# Patient Record
Sex: FEMALE | Race: WHITE | Employment: FULL TIME | URBAN - METROPOLITAN AREA
[De-identification: names, ages, dates, MRNs, and addresses within clinical notes are randomized per-mention and may not be internally consistent; named-entity substitution may affect disease eponyms.]

---

## 2024-11-13 ENCOUNTER — HOSPITAL ENCOUNTER (EMERGENCY)
Age: 29
Discharge: HOME | End: 2024-11-13
Payer: COMMERCIAL

## 2024-11-13 VITALS
OXYGEN SATURATION: 100 % | BODY MASS INDEX: 24.96 KG/M2 | TEMPERATURE: 98.4 F | RESPIRATION RATE: 16 BRPM | HEART RATE: 77 BPM | WEIGHT: 150 LBS | DIASTOLIC BLOOD PRESSURE: 82 MMHG | SYSTOLIC BLOOD PRESSURE: 121 MMHG

## 2024-11-13 DIAGNOSIS — J02.0 STREP PHARYNGITIS: Primary | ICD-10-CM

## 2024-11-13 LAB
SPECIMEN SOURCE: ABNORMAL
STREP A, MOLECULAR: POSITIVE

## 2024-11-13 PROCEDURE — 99211 OFF/OP EST MAY X REQ PHY/QHP: CPT

## 2024-11-13 PROCEDURE — 87651 STREP A DNA AMP PROBE: CPT

## 2024-11-13 RX ORDER — PREDNISONE 20 MG/1
20 TABLET ORAL 2 TIMES DAILY
Qty: 10 TABLET | Refills: 0 | Status: SHIPPED | OUTPATIENT
Start: 2024-11-13 | End: 2024-11-18

## 2024-11-13 RX ORDER — CEFDINIR 300 MG/1
300 CAPSULE ORAL 2 TIMES DAILY
Qty: 20 CAPSULE | Refills: 0 | Status: SHIPPED | OUTPATIENT
Start: 2024-11-13 | End: 2024-11-23

## 2024-11-13 ASSESSMENT — PAIN - FUNCTIONAL ASSESSMENT: PAIN_FUNCTIONAL_ASSESSMENT: NONE - DENIES PAIN

## 2024-11-13 NOTE — ED PROVIDER NOTES
Independent [facility] Visit.    HPI:  11/13/24,   Time: 5:50 PM ARACELI Brown is a 29 y.o. female presenting to the ED for pharyngitis.  Started suddenly this morning.  Her fiancé currently has strep and she now she thinks she has it.  No fevers.  Hurts to swallow.  States she looked in the back of her throat and saw white spots.  She still does have her tonsils.  Has not tried any medications for her pain.  Nothing makes it better or worse.  No trismus or drooling.    ROS:   Pertinent positives and negatives are stated within HPI, all other systems reviewed and are negative.  --------------------------------------------- PAST HISTORY ---------------------------------------------  Past Medical History:  has no past medical history on file.    Past Surgical History:  has no past surgical history on file.    Social History:  reports that she has never smoked. She has never used smokeless tobacco. She reports that she does not use drugs.    Family History: family history is not on file.     The patient’s home medications have been reviewed.    Allergies: Amoxicillin    -------------------------------------------------- RESULTS -------------------------------------------------  All laboratory and radiology results have been personally reviewed by myself   LABS:  Results for orders placed or performed during the hospital encounter of 11/13/24   Rapid Strep Screen    Specimen: Throat   Result Value Ref Range    Source .THROAT SWAB     Strep A, Molecular POSITIVE (A) NEGATIVE       RADIOLOGY:  Interpreted by Radiologist.  No orders to display       ------------------------- NURSING NOTES AND VITALS REVIEWED ---------------------------   The nursing notes within the ED encounter and vital signs as below have been reviewed.   /82   Pulse 77   Temp 98.4 °F (36.9 °C)   Resp 16   Wt 68 kg (150 lb)   LMP 11/01/2024 (Approximate)   SpO2 100%   BMI 24.96 kg/m²   Oxygen Saturation Interpretation:

## 2025-03-28 ENCOUNTER — HOSPITAL ENCOUNTER (EMERGENCY)
Age: 30
Discharge: HOME | End: 2025-03-28
Payer: COMMERCIAL

## 2025-03-28 VITALS
HEART RATE: 83 BPM | TEMPERATURE: 98 F | OXYGEN SATURATION: 100 % | DIASTOLIC BLOOD PRESSURE: 68 MMHG | SYSTOLIC BLOOD PRESSURE: 118 MMHG | RESPIRATION RATE: 18 BRPM

## 2025-03-28 DIAGNOSIS — J02.0 STREPTOCOCCAL SORE THROAT: Primary | ICD-10-CM

## 2025-03-28 LAB
SPECIMEN SOURCE: ABNORMAL
STREP A, MOLECULAR: POSITIVE

## 2025-03-28 PROCEDURE — 99211 OFF/OP EST MAY X REQ PHY/QHP: CPT

## 2025-03-28 PROCEDURE — 87651 STREP A DNA AMP PROBE: CPT

## 2025-03-28 RX ORDER — CEFDINIR 300 MG/1
300 CAPSULE ORAL 2 TIMES DAILY
Qty: 20 CAPSULE | Refills: 0 | Status: SHIPPED | OUTPATIENT
Start: 2025-03-28 | End: 2025-04-07

## 2025-03-28 ASSESSMENT — PAIN - FUNCTIONAL ASSESSMENT: PAIN_FUNCTIONAL_ASSESSMENT: 0-10

## 2025-03-28 ASSESSMENT — PAIN SCALES - GENERAL: PAINLEVEL_OUTOF10: 5

## 2025-03-28 NOTE — ED PROVIDER NOTES
Department of Emergency Medicine   ED  Encounter Note  Admit Date/RoomTime: 3/28/2025  6:11 PM  ED Room:       NAME: Evaristo Brown  : 1995  MRN: [id number]     Chief Complaint:  Pharyngitis (Thinks it is strep)    History of Present Illness      Evaristo Brown is a 29 y.o. old female who presents to urgent care by private vehicle, for gradual onset of midline sore throat pain, which occured 1 day(s) prior to arrival. Associated Signs & Symptoms: none. Since onset the symptoms have been stable. She is drinking plenty of fluids. There has been no pain, shortness breath, difficulty breathing or swelling.  No other URI symptoms.  Denies possibility of pregnancy.          Exposed To:  Streptococcus: unknown.                              Infectious Mononucleosis:  unknown.        Symptoms:  Pain:  Yes.                            Muffled Voice:  No.                            Hoarse:  No.                            Difficulty with Secretions:  No.      ROS   Pertinent positives and negatives are stated within HPI, all other systems reviewed and are negative.    Past Medical History:  has no past medical history on file.    Surgical History:  has no past surgical history on file.    Social History:  reports that she has never smoked. She has never used smokeless tobacco. She reports that she does not use drugs.    Family History: family history is not on file.     Allergies: Amoxicillin    Physical Exam   Oxygen Saturation Interpretation: Normal.        ED Triage Vitals [25 1815]   BP Systolic BP Percentile Diastolic BP Percentile Temp Temp src Pulse Respirations SpO2   118/68 -- -- 98 °F (36.7 °C) -- 83 18 100 %      Height Weight         -- --               Constitutional:  Alert, development consistent with age..  Ears:  normal TM's and external ear canals bilaterally  Throat: Airway Patent. moderate erythema.       Neck/Lymphatic:  Supple. There is Bilateral  anterior cervical node